# Patient Record
Sex: MALE | Race: BLACK OR AFRICAN AMERICAN
[De-identification: names, ages, dates, MRNs, and addresses within clinical notes are randomized per-mention and may not be internally consistent; named-entity substitution may affect disease eponyms.]

---

## 2023-03-21 ENCOUNTER — HOSPITAL ENCOUNTER (OUTPATIENT)
Facility: HOSPITAL | Age: 43
Setting detail: SPECIMEN
Discharge: HOME | End: 2023-03-24

## 2023-03-21 ENCOUNTER — OFFICE VISIT (OUTPATIENT)
Facility: CLINIC | Age: 43
End: 2023-03-21
Payer: OTHER GOVERNMENT

## 2023-03-21 VITALS
BODY MASS INDEX: 18.99 KG/M2 | TEMPERATURE: 97.1 F | RESPIRATION RATE: 15 BRPM | WEIGHT: 118.2 LBS | HEIGHT: 66 IN | HEART RATE: 84 BPM | DIASTOLIC BLOOD PRESSURE: 80 MMHG | SYSTOLIC BLOOD PRESSURE: 108 MMHG | OXYGEN SATURATION: 97 %

## 2023-03-21 DIAGNOSIS — Z76.89 ESTABLISHING CARE WITH NEW DOCTOR, ENCOUNTER FOR: Primary | ICD-10-CM

## 2023-03-21 DIAGNOSIS — Z12.11 ENCOUNTER FOR SCREENING FECAL OCCULT BLOOD TESTING: ICD-10-CM

## 2023-03-21 DIAGNOSIS — M54.2 NECK PAIN: ICD-10-CM

## 2023-03-21 DIAGNOSIS — Z13.0 SCREENING, ANEMIA, DEFICIENCY, IRON: ICD-10-CM

## 2023-03-21 DIAGNOSIS — Z13.228 SCREENING FOR METABOLIC DISORDER: ICD-10-CM

## 2023-03-21 DIAGNOSIS — R10.13 DYSPEPSIA: ICD-10-CM

## 2023-03-21 LAB
ALBUMIN SERPL-MCNC: 4 G/DL (ref 3.4–5)
ALBUMIN/GLOB SERPL: 1.1 (ref 0.8–1.7)
ALP SERPL-CCNC: 68 U/L (ref 45–117)
ALT SERPL-CCNC: 23 U/L (ref 16–61)
ANION GAP SERPL CALC-SCNC: 2 MMOL/L (ref 3–18)
AST SERPL-CCNC: 18 U/L (ref 10–38)
BASOPHILS # BLD: 0 K/UL (ref 0–0.1)
BASOPHILS NFR BLD: 0 % (ref 0–2)
BILIRUB SERPL-MCNC: 0.6 MG/DL (ref 0.2–1)
BUN SERPL-MCNC: 11 MG/DL (ref 7–18)
BUN/CREAT SERPL: 13 (ref 12–20)
CALCIUM SERPL-MCNC: 9.1 MG/DL (ref 8.5–10.1)
CHLORIDE SERPL-SCNC: 108 MMOL/L (ref 100–111)
CO2 SERPL-SCNC: 28 MMOL/L (ref 21–32)
CREAT SERPL-MCNC: 0.88 MG/DL (ref 0.6–1.3)
DIFFERENTIAL METHOD BLD: NORMAL
EOSINOPHIL # BLD: 0 K/UL (ref 0–0.4)
EOSINOPHIL NFR BLD: 1 % (ref 0–5)
ERYTHROCYTE [DISTWIDTH] IN BLOOD BY AUTOMATED COUNT: 12.6 % (ref 11.6–14.5)
GLOBULIN SER CALC-MCNC: 3.8 G/DL (ref 2–4)
GLUCOSE SERPL-MCNC: 67 MG/DL (ref 74–99)
HCT VFR BLD AUTO: 45.3 % (ref 36–48)
HGB BLD-MCNC: 15.6 G/DL (ref 13–16)
IMM GRANULOCYTES # BLD AUTO: 0 K/UL (ref 0–0.04)
IMM GRANULOCYTES NFR BLD AUTO: 0 % (ref 0–0.5)
LYMPHOCYTES # BLD: 2.3 K/UL (ref 0.9–3.6)
LYMPHOCYTES NFR BLD: 39 % (ref 21–52)
MCH RBC QN AUTO: 31.5 PG (ref 24–34)
MCHC RBC AUTO-ENTMCNC: 34.4 G/DL (ref 31–37)
MCV RBC AUTO: 91.5 FL (ref 78–100)
MONOCYTES # BLD: 0.5 K/UL (ref 0.05–1.2)
MONOCYTES NFR BLD: 9 % (ref 3–10)
NEUTS SEG # BLD: 3.1 K/UL (ref 1.8–8)
NEUTS SEG NFR BLD: 52 % (ref 40–73)
NRBC # BLD: 0 K/UL (ref 0–0.01)
NRBC BLD-RTO: 0 PER 100 WBC
PLATELET # BLD AUTO: 157 K/UL (ref 135–420)
PMV BLD AUTO: 9.9 FL (ref 9.2–11.8)
POTASSIUM SERPL-SCNC: 4 MMOL/L (ref 3.5–5.5)
PROT SERPL-MCNC: 7.8 G/DL (ref 6.4–8.2)
RBC # BLD AUTO: 4.95 M/UL (ref 4.35–5.65)
SODIUM SERPL-SCNC: 138 MMOL/L (ref 136–145)
WBC # BLD AUTO: 6 K/UL (ref 4.6–13.2)

## 2023-03-21 PROCEDURE — 36415 COLL VENOUS BLD VENIPUNCTURE: CPT

## 2023-03-21 PROCEDURE — 85025 COMPLETE CBC W/AUTO DIFF WBC: CPT

## 2023-03-21 PROCEDURE — 99203 OFFICE O/P NEW LOW 30 MIN: CPT | Performed by: INTERNAL MEDICINE

## 2023-03-21 PROCEDURE — 80053 COMPREHEN METABOLIC PANEL: CPT

## 2023-03-21 RX ORDER — HYDROXYCHLOROQUINE SULFATE 200 MG/1
200 TABLET, FILM COATED ORAL DAILY
COMMUNITY

## 2023-03-21 RX ORDER — ALBUTEROL SULFATE 90 UG/1
1-2 AEROSOL, METERED RESPIRATORY (INHALATION) EVERY 4 HOURS PRN
COMMUNITY
Start: 2021-12-29

## 2023-03-21 RX ORDER — PREDNISONE 2.5 MG
2.5 TABLET ORAL DAILY
COMMUNITY
Start: 2023-03-01

## 2023-03-21 SDOH — ECONOMIC STABILITY: FOOD INSECURITY: WITHIN THE PAST 12 MONTHS, THE FOOD YOU BOUGHT JUST DIDN'T LAST AND YOU DIDN'T HAVE MONEY TO GET MORE.: NEVER TRUE

## 2023-03-21 SDOH — ECONOMIC STABILITY: INCOME INSECURITY: HOW HARD IS IT FOR YOU TO PAY FOR THE VERY BASICS LIKE FOOD, HOUSING, MEDICAL CARE, AND HEATING?: NOT HARD AT ALL

## 2023-03-21 SDOH — ECONOMIC STABILITY: HOUSING INSECURITY
IN THE LAST 12 MONTHS, WAS THERE A TIME WHEN YOU DID NOT HAVE A STEADY PLACE TO SLEEP OR SLEPT IN A SHELTER (INCLUDING NOW)?: NO

## 2023-03-21 SDOH — ECONOMIC STABILITY: FOOD INSECURITY: WITHIN THE PAST 12 MONTHS, YOU WORRIED THAT YOUR FOOD WOULD RUN OUT BEFORE YOU GOT MONEY TO BUY MORE.: NEVER TRUE

## 2023-03-21 ASSESSMENT — ANXIETY QUESTIONNAIRES
3. WORRYING TOO MUCH ABOUT DIFFERENT THINGS: 1
1. FEELING NERVOUS, ANXIOUS, OR ON EDGE: 2
2. NOT BEING ABLE TO STOP OR CONTROL WORRYING: 1
IF YOU CHECKED OFF ANY PROBLEMS ON THIS QUESTIONNAIRE, HOW DIFFICULT HAVE THESE PROBLEMS MADE IT FOR YOU TO DO YOUR WORK, TAKE CARE OF THINGS AT HOME, OR GET ALONG WITH OTHER PEOPLE: NOT DIFFICULT AT ALL
5. BEING SO RESTLESS THAT IT IS HARD TO SIT STILL: 2
4. TROUBLE RELAXING: 1
GAD7 TOTAL SCORE: 13
7. FEELING AFRAID AS IF SOMETHING AWFUL MIGHT HAPPEN: 3
6. BECOMING EASILY ANNOYED OR IRRITABLE: 3

## 2023-03-21 ASSESSMENT — ENCOUNTER SYMPTOMS
DIARRHEA: 0
SHORTNESS OF BREATH: 0
CONSTIPATION: 0
ABDOMINAL PAIN: 0
COUGH: 0

## 2023-03-21 ASSESSMENT — PATIENT HEALTH QUESTIONNAIRE - PHQ9
SUM OF ALL RESPONSES TO PHQ QUESTIONS 1-9: 0
SUM OF ALL RESPONSES TO PHQ9 QUESTIONS 1 & 2: 0
1. LITTLE INTEREST OR PLEASURE IN DOING THINGS: 0
2. FEELING DOWN, DEPRESSED OR HOPELESS: 0
SUM OF ALL RESPONSES TO PHQ QUESTIONS 1-9: 0

## 2023-03-21 NOTE — PROGRESS NOTES
Shannon León is a 38 y.o.  male presents today for office visit for   Chief Complaint   Patient presents with    New Patient    Establish Care   . Pt is  fasting. Patient is accompanied by self. I have received verbal consent from Shannon León to discuss any/all medical information while they are present in the room 9. Espinoza Brooks preferred language for health care discussion is english. Is the patient using any DME equipment during OV? No    Advance Directive:  1. Do you have an advance directive in place? Patient Reply: No    2. If not, would you like material regarding how to put one in place? Yes      1. \"Have you been to the ER, urgent care clinic since your last visit? Hospitalized since your last visit? \" No    2. \"Have you seen or consulted any other health care providers outside of the [address] [place] [address] since your last visit? \" Yes Dr. Yanci Harrington Rheumatologist     3. For patients aged 39-70: Has the patient had a colonoscopy? N/A     If the patient is female:    4. For patients aged 41-77: Has the patient had a mammogram within the past 2 years? N/A    5. For patients aged 21-65: Has the patient had a pap smear? N/A    Upcoming Appts  No    VORB: No orders of the defined types were placed in this encounter.  Gume Dyer MD/Becka Ewingcarlitos Poole Never is due for:   Health Maintenance Due   Topic    Depression Screen     HIV screen     Hepatitis C screen     DTaP/Tdap/Td vaccine (1 - Tdap)    Varicella vaccine (2 of 2 - 13+ 2-dose series)    COVID-19 Vaccine (3 - Booster for Carrizales Peter series)    Flu vaccine (1)    Lipids      Health Maintenance reviewed and discussed per provider  Please order/place referral if appropriate. Requested Prescriptions      No prescriptions requested or ordered in this encounter       Learning Assessment:  No flowsheet data found. Depression Screening:  No flowsheet data found. Abuse Screening:  No flowsheet data found.   Fall Risk  No
Dyspepsia  R10.13 H. Pylori Antigen, Stool      4. Screening, anemia, deficiency, iron  Z13.0 CBC with Auto Differential      5. Screening for metabolic disorder  O81.278 Comprehensive Metabolic Panel      6. Encounter for screening fecal occult blood testing  Z12.11 POCT FECAL IMMUNOCHEMICAL TEST (FIT) ()              Plan:   Return in about 1 month (around 4/21/2023) for HM, Follow up. Will check H. pylori test.  CBC screening for anemia. Other screening tests. X-ray cervical spine. If normal he can proceed with physical therapy muscle relaxants and pain medication.   Kulwinder Layne MD

## 2023-03-22 ENCOUNTER — HOSPITAL ENCOUNTER (OUTPATIENT)
Facility: HOSPITAL | Age: 43
Setting detail: SPECIMEN
Discharge: HOME | End: 2023-03-25

## 2023-03-22 LAB — SENTARA SPECIMEN COLLECTION: NORMAL

## 2023-03-22 PROCEDURE — 99001 SPECIMEN HANDLING PT-LAB: CPT

## 2023-03-23 LAB — HEMOCCULT STL QL IA: NEGATIVE

## 2023-03-24 DIAGNOSIS — M62.838 MUSCLE SPASM: ICD-10-CM

## 2023-03-24 DIAGNOSIS — M54.2 NECK PAIN: Primary | ICD-10-CM

## 2023-03-24 RX ORDER — CYCLOBENZAPRINE HCL 5 MG
TABLET ORAL
Qty: 3 TABLET | Refills: 0 | Status: SHIPPED | OUTPATIENT
Start: 2023-03-24

## 2023-03-24 RX ORDER — IBUPROFEN 200 MG
200 TABLET ORAL 2 TIMES DAILY PRN
Qty: 20 TABLET | Refills: 0 | Status: SHIPPED | OUTPATIENT
Start: 2023-03-24

## 2023-03-24 NOTE — PROGRESS NOTES
Patient was called. 2 steps verification. Cervical x-ray was unremarkable. Neck pain seems to be related to muscle spasm and bad posture. He will benefit from ibuprofen as needed. Today, cyclobenzaprine 2.5 mg (half tablets) at nighttime only, to prevent accidents from secondary effects such as drowsiness, told not to drive or use machinery, patient was educated about this and instructed to break the tablet in half. And physical therapy at the [facility], patient was provided with information he will call and set an appointment. Patient understood and agreed with the plan.

## 2023-03-25 LAB — H. PYLORI STOOL AG,EIA: NEGATIVE

## 2023-03-27 DIAGNOSIS — R10.13 DYSPEPSIA: Primary | ICD-10-CM

## 2023-04-24 ENCOUNTER — OFFICE VISIT (OUTPATIENT)
Facility: CLINIC | Age: 43
End: 2023-04-24
Payer: OTHER GOVERNMENT

## 2023-04-24 ENCOUNTER — HOSPITAL ENCOUNTER (OUTPATIENT)
Facility: HOSPITAL | Age: 43
Setting detail: SPECIMEN
Discharge: HOME | End: 2023-04-27
Payer: OTHER GOVERNMENT

## 2023-04-24 VITALS
SYSTOLIC BLOOD PRESSURE: 121 MMHG | OXYGEN SATURATION: 100 % | BODY MASS INDEX: 19.19 KG/M2 | HEIGHT: 66 IN | RESPIRATION RATE: 14 BRPM | DIASTOLIC BLOOD PRESSURE: 83 MMHG | HEART RATE: 85 BPM | WEIGHT: 119.4 LBS | TEMPERATURE: 96.9 F

## 2023-04-24 DIAGNOSIS — Z13.6 ENCOUNTER FOR SCREENING FOR CORONARY ARTERY DISEASE: ICD-10-CM

## 2023-04-24 DIAGNOSIS — Z11.59 ENCOUNTER FOR HEPATITIS C SCREENING TEST FOR LOW RISK PATIENT: ICD-10-CM

## 2023-04-24 DIAGNOSIS — Z23 ENCOUNTER FOR VACCINATION: Primary | ICD-10-CM

## 2023-04-24 DIAGNOSIS — Z11.4 SCREENING FOR HIV (HUMAN IMMUNODEFICIENCY VIRUS): ICD-10-CM

## 2023-04-24 DIAGNOSIS — D17.0 LIPOMA OF SCALP: ICD-10-CM

## 2023-04-24 LAB
CHOLEST SERPL-MCNC: 169 MG/DL
HDLC SERPL-MCNC: 76 MG/DL (ref 40–60)
HDLC SERPL: 2.2 (ref 0–5)
LDLC SERPL CALC-MCNC: 80.8 MG/DL (ref 0–100)
LIPID PANEL: ABNORMAL
TRIGL SERPL-MCNC: 61 MG/DL
VLDLC SERPL CALC-MCNC: 12.2 MG/DL

## 2023-04-24 PROCEDURE — 90715 TDAP VACCINE 7 YRS/> IM: CPT | Performed by: INTERNAL MEDICINE

## 2023-04-24 PROCEDURE — 87389 HIV-1 AG W/HIV-1&-2 AB AG IA: CPT

## 2023-04-24 PROCEDURE — 80061 LIPID PANEL: CPT

## 2023-04-24 PROCEDURE — 36415 COLL VENOUS BLD VENIPUNCTURE: CPT

## 2023-04-24 PROCEDURE — 86803 HEPATITIS C AB TEST: CPT

## 2023-04-24 PROCEDURE — 99213 OFFICE O/P EST LOW 20 MIN: CPT | Performed by: INTERNAL MEDICINE

## 2023-04-24 PROCEDURE — 90471 IMMUNIZATION ADMIN: CPT | Performed by: INTERNAL MEDICINE

## 2023-04-24 ASSESSMENT — ENCOUNTER SYMPTOMS
DIARRHEA: 0
SHORTNESS OF BREATH: 0
COUGH: 0
ABDOMINAL PAIN: 0
CONSTIPATION: 0

## 2023-04-24 NOTE — PATIENT INSTRUCTIONS
[facility] - César John MD  [address] [address], [address] [place] [address]  [phone]

## 2023-04-24 NOTE — PROGRESS NOTES
Zen Bucio is a 38 y.o.  male presents today for office visit for   Chief Complaint   Patient presents with    Health Maintenance   . 1. \"Have you been to the ER, urgent care clinic since your last visit? Hospitalized since your last visit? \" No    2. \"Have you seen or consulted any other health care providers outside of the [address] [place] [address] since your last visit? \" Yes, Nurse visit with Dr. Chilango Parnell office for Kenalog injection. 3. For patients aged 39-70: Has the patient had a colonoscopy / FIT/ Cologuard? N/A     If the patient is female:    4. For patients aged 41-77: Has the patient had a mammogram within the past 2 years? N/A    5. For patients aged 21-65: Has the patient had a pap smear?  N/A

## 2023-04-24 NOTE — PROGRESS NOTES
Subjective:      Patient ID: Tracie Nyhan is a 38 y.o. male. Follow up    Patient is complaining of abdominal discomfort, she is using ibuprofen regularly for months for his neck. He tried over-the-counter Protonix which relieved the pain. H. pylori test was negative. Fit test was negative. Hemoglobin was normal.  Referral was sent to gastroenterologist but he has Rosa M Huies. I will resend the referrals for GI and for PT for his neck. He did have improvement of the neck pain with muscle relaxants. Today he is complaining of arm scalp nodule present for several years and unchanged. Started after a head trauma. SLE  FU rheumatologist Dr. Senait Wing in . FU q6m. Hx pneumothorax/ webs bilaterally  Left lung surgery Webs and subsequent pneumothorax/lung collapse 2020. Right pneumothorax with conservative management. Thoracic surgeon Dr. Ryan Woods.     Emphysema/hx of smoking marihuana  Albuterol prn. Usually once a month. Hx of smoking marihuana  Stopped 02/2023. Low weight  BMI 19. Weight fluctuates from 110 to 120 pounds. No red flags regarding underlying neoplasia. Fit test today CBC for screening of anemia. Dyspepsia  Likely secondary to NSAIDs chronic issues. Instructed not to take NSAIDs anymore. He can continue Protonix over-the-counter. [facility]. Chronic neck pain  Cervical spine x-ray WNL. Physical therapy referral.    Low weight habitus  Mostly unchanged last few years. No red flags. Fit test negative, hemoglobin WNL        Review of Systems   Constitutional:  Negative for chills and fever. Respiratory:  Negative for cough and shortness of breath. Cardiovascular:  Negative for chest pain. Gastrointestinal:  Negative for abdominal pain, constipation and diarrhea.      Objective:   Visit Vitals  /83   Pulse 85   Temp 96.9 °F (36.1 °C) (Temporal)   Resp 14   Ht 5' 6\" (1.676 m)   Wt 119 lb 6.4 oz (54.2 kg)   SpO2 100%   BMI 19.27

## 2023-04-25 LAB
HCV AB SER IA-ACNC: 0.04 INDEX
HCV AB SERPL QL IA: NEGATIVE
Lab: NORMAL

## 2023-04-26 LAB
HIV 1+2 AB+HIV1 P24 AG SERPL QL IA: NONREACTIVE
HIV 1/2 RESULT COMMENT: NORMAL

## 2025-02-11 ENCOUNTER — OFFICE VISIT (OUTPATIENT)
Facility: CLINIC | Age: 45
End: 2025-02-11

## 2025-02-11 VITALS
RESPIRATION RATE: 16 BRPM | SYSTOLIC BLOOD PRESSURE: 131 MMHG | DIASTOLIC BLOOD PRESSURE: 88 MMHG | OXYGEN SATURATION: 99 % | HEART RATE: 78 BPM | WEIGHT: 141.8 LBS | TEMPERATURE: 97.7 F | BODY MASS INDEX: 22.79 KG/M2 | HEIGHT: 66 IN

## 2025-02-11 DIAGNOSIS — Z13.6 ENCOUNTER FOR SCREENING FOR CORONARY ARTERY DISEASE: ICD-10-CM

## 2025-02-11 DIAGNOSIS — Z13.1 SCREENING FOR DIABETES MELLITUS (DM): ICD-10-CM

## 2025-02-11 DIAGNOSIS — Z13.0 SCREENING FOR IRON DEFICIENCY ANEMIA: ICD-10-CM

## 2025-02-11 DIAGNOSIS — Z13.29 SCREENING FOR THYROID DISORDER: ICD-10-CM

## 2025-02-11 DIAGNOSIS — M62.08 DIASTASIS OF RECTUS ABDOMINIS: ICD-10-CM

## 2025-02-11 DIAGNOSIS — R19.5 DARK STOOLS: ICD-10-CM

## 2025-02-11 DIAGNOSIS — Z13.228 SCREENING FOR METABOLIC DISORDER: ICD-10-CM

## 2025-02-11 DIAGNOSIS — N50.812 LEFT TESTICULAR PAIN: ICD-10-CM

## 2025-02-11 DIAGNOSIS — Z00.00 ANNUAL PHYSICAL EXAM: Primary | ICD-10-CM

## 2025-02-11 SDOH — ECONOMIC STABILITY: FOOD INSECURITY: WITHIN THE PAST 12 MONTHS, YOU WORRIED THAT YOUR FOOD WOULD RUN OUT BEFORE YOU GOT MONEY TO BUY MORE.: NEVER TRUE

## 2025-02-11 SDOH — ECONOMIC STABILITY: FOOD INSECURITY: WITHIN THE PAST 12 MONTHS, THE FOOD YOU BOUGHT JUST DIDN'T LAST AND YOU DIDN'T HAVE MONEY TO GET MORE.: NEVER TRUE

## 2025-02-11 ASSESSMENT — PATIENT HEALTH QUESTIONNAIRE - PHQ9
SUM OF ALL RESPONSES TO PHQ QUESTIONS 1-9: 0
2. FEELING DOWN, DEPRESSED OR HOPELESS: NOT AT ALL
SUM OF ALL RESPONSES TO PHQ QUESTIONS 1-9: 0
SUM OF ALL RESPONSES TO PHQ QUESTIONS 1-9: 0
SUM OF ALL RESPONSES TO PHQ9 QUESTIONS 1 & 2: 0
SUM OF ALL RESPONSES TO PHQ QUESTIONS 1-9: 0
1. LITTLE INTEREST OR PLEASURE IN DOING THINGS: NOT AT ALL

## 2025-02-11 ASSESSMENT — ENCOUNTER SYMPTOMS
SHORTNESS OF BREATH: 0
ABDOMINAL PAIN: 0
BLOOD IN STOOL: 0
COUGH: 0
CONSTIPATION: 0
DIARRHEA: 0

## 2025-02-11 NOTE — PROGRESS NOTES
Subjective:      Patient ID: Espinoza Brooks is a 44 y.o. male.    Annual physical    Patient has multiple concerns today.  He noted a bulge in his abdomen above umbilical scar.  Physical exam consistent with reducible ventral hernia diastases of rectus.  Recommended evaluation by surgery, since patient reporting discomfort on and off.  Patient also reported dark stools but not black.  He is concerned about it, will order stool test.  He has no family history of colon cancer.  He gained 20 pounds since last visit.  He also noted discomfort in the left testicle 3 days ago and resolved.  Educated regarding red flags of testicular pain  Genitourinary exam was unremarkable.  No testicular masses, no lymphadenopathies, no tenderness on palpation, both testicles are soft with regular borders.  No other complaints or concerns.    I spent 20 minutes assessing the patient for his acute complaints.        SLE  Hydroxychloroquine - stopped in 2023  Predniosne - stopped in 2023  FU rheumatologist Dr. Wenceslao Miguel in . FU q6m.     Hx pneumothorax/ webs bilaterally  Left lung surgery Webs and subsequent pneumothorax/lung collapse 2020. Right pneumothorax with conservative management.  Thoracic surgeon Dr. Mishra.     Emphysema/hx of smoking marihuana  Albuterol prn. Usually once a month.     Hx of smoking marihuana  Stopped 02/2023.     Low weight  BMI 19.  Weight fluctuates from 110 to 120 pounds.  No red flags regarding underlying neoplasia.  Fit test today CBC for screening of anemia.    Dyspepsia  Likely secondary to NSAIDs chronic issues.  Instructed not to take NSAIDs anymore.  He can continue Protonix over-the-counter.  [facility].    Chronic neck pain  Cervical spine x-ray WNL.  Physical therapy referral.    Low weight habitus  Mostly unchanged last few years.  No red flags.  Fit test negative, hemoglobin WNL      PAST MEDICAL HISTORY  Medical: as listed.  Surgical: as listed.  Allergies: as

## 2025-02-13 ENCOUNTER — HOSPITAL ENCOUNTER (OUTPATIENT)
Facility: HOSPITAL | Age: 45
Setting detail: SPECIMEN
Discharge: HOME | End: 2025-02-16
Payer: COMMERCIAL

## 2025-02-13 DIAGNOSIS — Z13.29 SCREENING FOR THYROID DISORDER: ICD-10-CM

## 2025-02-13 DIAGNOSIS — Z13.228 SCREENING FOR METABOLIC DISORDER: ICD-10-CM

## 2025-02-13 DIAGNOSIS — Z13.1 SCREENING FOR DIABETES MELLITUS (DM): ICD-10-CM

## 2025-02-13 DIAGNOSIS — Z13.0 SCREENING FOR IRON DEFICIENCY ANEMIA: ICD-10-CM

## 2025-02-13 DIAGNOSIS — Z13.6 ENCOUNTER FOR SCREENING FOR CORONARY ARTERY DISEASE: ICD-10-CM

## 2025-02-13 LAB
ALBUMIN SERPL-MCNC: 3.7 G/DL (ref 3.4–5)
ALBUMIN/GLOB SERPL: 1 (ref 0.8–1.7)
ALP SERPL-CCNC: 78 U/L (ref 45–117)
ALT SERPL-CCNC: 20 U/L (ref 16–61)
ANION GAP SERPL CALC-SCNC: 4 MMOL/L (ref 3–18)
AST SERPL-CCNC: 19 U/L (ref 10–38)
BASOPHILS # BLD: 0.03 K/UL (ref 0–0.1)
BASOPHILS NFR BLD: 0.5 % (ref 0–2)
BILIRUB SERPL-MCNC: 0.5 MG/DL (ref 0.2–1)
BUN SERPL-MCNC: 12 MG/DL (ref 7–18)
BUN/CREAT SERPL: 11 (ref 12–20)
CALCIUM SERPL-MCNC: 8.9 MG/DL (ref 8.5–10.1)
CHLORIDE SERPL-SCNC: 103 MMOL/L (ref 100–111)
CHOLEST SERPL-MCNC: 162 MG/DL
CO2 SERPL-SCNC: 28 MMOL/L (ref 21–32)
CREAT SERPL-MCNC: 1.05 MG/DL (ref 0.6–1.3)
DIFFERENTIAL METHOD BLD: NORMAL
EOSINOPHIL # BLD: 0.05 K/UL (ref 0–0.4)
EOSINOPHIL NFR BLD: 0.9 % (ref 0–5)
ERYTHROCYTE [DISTWIDTH] IN BLOOD BY AUTOMATED COUNT: 12.5 % (ref 11.6–14.5)
EST. AVERAGE GLUCOSE BLD GHB EST-MCNC: 108 MG/DL
GLOBULIN SER CALC-MCNC: 3.7 G/DL (ref 2–4)
GLUCOSE SERPL-MCNC: 109 MG/DL (ref 74–99)
HBA1C MFR BLD: 5.4 % (ref 4.2–5.6)
HCT VFR BLD AUTO: 45.6 % (ref 36–48)
HDLC SERPL-MCNC: 59 MG/DL (ref 40–60)
HDLC SERPL: 2.7 (ref 0–5)
HGB BLD-MCNC: 14.9 G/DL (ref 13–16)
IMM GRANULOCYTES # BLD AUTO: 0.01 K/UL (ref 0–0.04)
IMM GRANULOCYTES NFR BLD AUTO: 0.2 % (ref 0–0.5)
LDLC SERPL CALC-MCNC: 88.8 MG/DL (ref 0–100)
LIPID PANEL: NORMAL
LYMPHOCYTES # BLD: 1.52 K/UL (ref 0.9–3.6)
LYMPHOCYTES NFR BLD: 26.1 % (ref 21–52)
MCH RBC QN AUTO: 30.2 PG (ref 24–34)
MCHC RBC AUTO-ENTMCNC: 32.7 G/DL (ref 31–37)
MCV RBC AUTO: 92.3 FL (ref 78–100)
MONOCYTES # BLD: 0.54 K/UL (ref 0.05–1.2)
MONOCYTES NFR BLD: 9.3 % (ref 3–10)
NEUTS SEG # BLD: 3.68 K/UL (ref 1.8–8)
NEUTS SEG NFR BLD: 63 % (ref 40–73)
NRBC # BLD: 0 K/UL (ref 0–0.01)
NRBC BLD-RTO: 0 PER 100 WBC
PLATELET # BLD AUTO: 217 K/UL (ref 135–420)
PMV BLD AUTO: 9.8 FL (ref 9.2–11.8)
POTASSIUM SERPL-SCNC: 3.9 MMOL/L (ref 3.5–5.5)
PROT SERPL-MCNC: 7.4 G/DL (ref 6.4–8.2)
RBC # BLD AUTO: 4.94 M/UL (ref 4.35–5.65)
SODIUM SERPL-SCNC: 135 MMOL/L (ref 136–145)
T4 FREE SERPL-MCNC: 0.9 NG/DL (ref 0.7–1.5)
TRIGL SERPL-MCNC: 71 MG/DL
TSH SERPL DL<=0.05 MIU/L-ACNC: 3.5 UIU/ML (ref 0.36–3.74)
VLDLC SERPL CALC-MCNC: 14.2 MG/DL
WBC # BLD AUTO: 5.8 K/UL (ref 4.6–13.2)

## 2025-02-13 PROCEDURE — 84443 ASSAY THYROID STIM HORMONE: CPT

## 2025-02-13 PROCEDURE — 85025 COMPLETE CBC W/AUTO DIFF WBC: CPT

## 2025-02-13 PROCEDURE — 83036 HEMOGLOBIN GLYCOSYLATED A1C: CPT

## 2025-02-13 PROCEDURE — 84439 ASSAY OF FREE THYROXINE: CPT

## 2025-02-13 PROCEDURE — 80061 LIPID PANEL: CPT

## 2025-02-13 PROCEDURE — 36415 COLL VENOUS BLD VENIPUNCTURE: CPT

## 2025-02-13 PROCEDURE — 80053 COMPREHEN METABOLIC PANEL: CPT

## 2025-02-14 LAB — HEMOCCULT STL QL IA: NEGATIVE

## 2025-02-25 ENCOUNTER — OFFICE VISIT (OUTPATIENT)
Age: 45
End: 2025-02-25
Payer: COMMERCIAL

## 2025-02-25 VITALS
WEIGHT: 142 LBS | TEMPERATURE: 99 F | BODY MASS INDEX: 22.82 KG/M2 | HEIGHT: 66 IN | SYSTOLIC BLOOD PRESSURE: 127 MMHG | DIASTOLIC BLOOD PRESSURE: 83 MMHG | OXYGEN SATURATION: 98 % | HEART RATE: 78 BPM

## 2025-02-25 DIAGNOSIS — M62.08 DIASTASIS RECTI: Primary | ICD-10-CM

## 2025-02-25 DIAGNOSIS — M62.08 DIASTASIS OF RECTUS ABDOMINIS: Primary | ICD-10-CM

## 2025-02-25 PROCEDURE — 99204 OFFICE O/P NEW MOD 45 MIN: CPT | Performed by: SURGERY

## 2025-02-25 NOTE — PROGRESS NOTES
Espinoza Brooks is a 44 y.o. male (: 1980) presenting to address:    Chief Complaint   Patient presents with    New Patient     Ventral hernia and Diastasis recti/referred by Dr. Alen Richard       Medication list and allergies have been reviewed with Espinoza Brooks and updated as of today's date.     I have gone over all Medical, Surgical and Social History with Espinoza Brooks and updated/added the information accordingly.

## 2025-02-25 NOTE — PROGRESS NOTES
General Surgery Consult      Espinoza Brooks  Admit date: (Not on file)    MRN: [id number]     : 1980     Age: 44 y.o.        Attending Physician: Ghanshyam Maynard MD, [facility]      History of Present Illness:     Espinoza Brooks is a 44 y.o. male who was referred to me by Dr. Adames for diastases recti and possible ventral hernia.  The patient stated that 1 time he was taking a shower and he noticed a bulge in the supraumbilical area.  He denies any abdominal pain or discomfort and he denies any constipation.  He stated that his mom told him that he when he was a  he had a hernia that was pushed back in but according to him she told him that they did not do any surgery on it and according to him it was also located in the upper abdomen not in the groin area.     There are no problems to display for this patient.    Past Medical History:   Diagnosis Date    Asthma     Emphysema of lung (HCC)     Osteoarthritis     SLE (systemic lupus erythematosus) (HCC)     Spontaneous pneumothorax       Past Surgical History:   Procedure Laterality Date    LUNG SURGERY Left 2019    EXCISION, BLEB, LUNG, THORACOSCOPIC, WITH BRONCHOSCOPY IF INDICATED      Social History     Tobacco Use    Smoking status: Never    Smokeless tobacco: Never   Substance Use Topics    Alcohol use: Yes     Alcohol/week: 1.0 standard drink of alcohol     Types: 1 Shots of liquor per week      Social History     Tobacco Use   Smoking Status Never   Smokeless Tobacco Never     Family History   Problem Relation Age of Onset    Hypertension Mother     Diabetes Maternal Grandmother     Heart Failure Maternal Grandfather       Current Outpatient Medications   Medication Sig    ibuprofen (ADVIL;MOTRIN) 200 MG tablet Take 1 tablet by mouth 2 times daily as needed for Pain Always with meals to avoid stomach irritation.    hydroxychloroquine (PLAQUENIL) 200 MG tablet Take 1 tablet by mouth daily (Patient not taking: Reported on 2025)    albuterol